# Patient Record
Sex: MALE | Race: WHITE
[De-identification: names, ages, dates, MRNs, and addresses within clinical notes are randomized per-mention and may not be internally consistent; named-entity substitution may affect disease eponyms.]

---

## 2018-11-23 ENCOUNTER — HOSPITAL ENCOUNTER (OUTPATIENT)
Dept: HOSPITAL 47 - RADUSWWP | Age: 68
Discharge: HOME | End: 2018-11-23
Attending: FAMILY MEDICINE
Payer: MEDICARE

## 2018-11-23 DIAGNOSIS — Z13.6: Primary | ICD-10-CM

## 2018-11-23 PROCEDURE — 93979 VASCULAR STUDY: CPT

## 2018-11-23 NOTE — US
EXAMINATION TYPE: US duplex aorta

 

DATE OF EXAM: 11/23/2018

 

COMPARISON: NONE

 

CLINICAL HISTORY: Z13.6 Encounter for screening for cardiovascular d; AAA screening, no HTN

 

EXAM MEASUREMENTS:

 

Abdominal Aorta:

** Proximal:  3.2cm Transverse by 2.7 cm AP

** Mid:  2.2cm Transverse by 1.9 cm AP

** Distal:  2.0cm Transverse by 1.9 cm AP

** Bifurcation:  1.3cm Right APRIL Transverse by 1.2 cm AP; 1.3cm Left APRIL Transverse by 1.3 cm AP

 

Color flow patency noted distal aorta with PSV of 77.6cm/sec. Aorta wall size is abnormal in Transver
se measure. Intimal segmental wall calcification is noted throughout.

 

 

 

IMPRESSION: 

1. No abdominal aortic aneurysm.

## 2021-04-09 ENCOUNTER — HOSPITAL ENCOUNTER (OUTPATIENT)
Dept: HOSPITAL 47 - RADECHMAIN | Age: 71
Discharge: HOME | End: 2021-04-09
Attending: FAMILY MEDICINE
Payer: MEDICARE

## 2021-04-09 DIAGNOSIS — I37.1: ICD-10-CM

## 2021-04-09 DIAGNOSIS — I08.1: Primary | ICD-10-CM

## 2021-04-09 PROCEDURE — 93306 TTE W/DOPPLER COMPLETE: CPT

## 2021-04-09 NOTE — ECHOF
Referral Reason:R06.02 SOB, R07.89 Chest Pain



MEASUREMENTS

--------

HEIGHT: 185.4 cm

WEIGHT: 79.4 kg

BP: 124/61

RVIDd:   3.3 cm     (< 3.3)

IVSd:   1.2 cm     (0.6 - 1.1)

LVIDd:   4.1 cm     (3.9 - 5.3)

LVPWd:   1.2 cm     (0.6 - 1.1)

IVSs:   1.9 cm

LVIDs:   2.2 cm

LVPWs:   1.4 cm

LA Diam:   3.8 cm     (2.7 - 3.8)

LAESV Index (A-L):   22.31 ml/m

Ao Diam:   4.0 cm     (2.0 - 3.7)

AV Cusp:   2.2 cm     (1.5 - 2.6)

MV EXCURSION:   15.119 mm     (> 18.000)

MV EF SLOPE:   17 mm/s     (70 - 150)

EPSS:   0.6 cm

MV E Juan:   0.83 m/s

MV DecT:   335 ms

MV A Juan:   0.87 m/s

MV E/A Ratio:   0.96 

RAP:   5.00 mmHg

RVSP:   21.00 mmHg







FINDINGS

--------

Sinus rhythm.

This was a technically adequate study.

The left ventricular size is normal.   There is borderline concentric left ventricular hypertrophy.  
 Overall left ventricular systolic function is normal with, an EF between 60 - 65 %.

The right ventricle is mildly enlarged.

Normal LA  size by volume 22+/-6 ml/m2.

The right atrium is normal in size.

Interatrial and interventricular septum intact.

The aortic valve is trileaflet and appears structurally normal.   Trace to mild aortic regurgitation.


The mitral valve leaflets are mildly thickened.   Mild mitral annular calcification present.

Mild tricuspid regurgitation present.   Right ventricular systolic pressure is normal at < 35 mmHg.

Trace/mild (physiologic)  pulmonic regurgitation.

The aortic root is dilated measuring 4.0cm.

Normal inferior vena cava with normal inspiratory collapse consistent with estimated right atrial pre
ssure of  5 mmHg.

There is no pericardial effusion.



CONCLUSIONS

--------

1. The left ventricular size is normal.

2. There is borderline concentric left ventricular hypertrophy.

3. Overall left ventricular systolic function is normal with, an EF between 60 - 65 %.

4. The right ventricle is mildly enlarged.

5. Normal LA size by volume 22+/-6 ml/m2.

6. The aortic valve is trileaflet and appears structurally normal.

7. Trace to mild aortic regurgitation.

8. The mitral valve leaflets are mildly thickened.

9. Mild mitral annular calcification present.

10. Mild tricuspid regurgitation present.

11. Trace/mild (physiologic)  pulmonic regurgitation.

12. The aortic root is dilated measuring 4.0cm.

13. There is no pericardial effusion.





SONOGRAPHER: Latasha Boyle RDCS

## 2021-04-30 ENCOUNTER — HOSPITAL ENCOUNTER (OUTPATIENT)
Dept: HOSPITAL 47 - RADNMMAIN | Age: 71
Discharge: HOME | End: 2021-04-30
Attending: FAMILY MEDICINE
Payer: MEDICARE

## 2021-04-30 DIAGNOSIS — R07.9: Primary | ICD-10-CM

## 2021-04-30 PROCEDURE — 78452 HT MUSCLE IMAGE SPECT MULT: CPT

## 2021-04-30 PROCEDURE — 93017 CV STRESS TEST TRACING ONLY: CPT

## 2021-04-30 NOTE — EST
EXERCISE STRESS



AGE:

70



SEX:

Male



HT:

73"



WT:

175 pounds



PROTOCOL:

Lexiscan Cardiolite



STAGE:



DURATION OF EXERCISE:



HEART RATE REST:

55



BLOOD PRESSURE REST:

120/70



MAXIMUM HEART RATE ACHIEVED:

80



MAXIMUM BLOOD PRESSURE:

126/70



85% MPHR:

129



100% MPHR:

152



METS:



INDICATIONS:

Chest pain.



CLINICAL INFORMATION:

Baseline rhythm is a sinus mechanism, rate of 55, normal axis and intervals.  Normal

electrocardiogram. Baseline blood pressure 120/70 mmHg.  Patient received injection of

Lexiscan. Electrocardiograph monitoring revealed no evidence of diagnostic ischemic ST

deviation. Cardiolite was injected per protocol.



CONCLUSION:

1. Nondiagnostic electrocardiograph stress testing.

2. Nuclear images will be reported separately.





MMODL / IJN: [id number] / Job#: [id number]

## 2021-04-30 NOTE — NM
EXAMINATION TYPE: NM stress lexiscan cardiolite

 

DATE OF EXAM: 4/30/2021

 

COMPARISON: NONE

 

HISTORY: Chest pain

 

TECHNIQUE:  After the intravenous administration of 9.8 mCi Tc 99m Sestamibi - Cardiolite resting SPE
CT images acquired 45 minutes post injection. 

 

The patient received 0.4mg Lexiscan, 25.0 mCi Tc 99m Sestamibi - Stress images obtained 30 minutes po
st injection 

 

FINDINGS:  

 

Review of stress and rest SPECT images demonstrates fixed defect involving the apex as well as a area
 of reversibility involving the apical inferior myocardium..  Gated analysis shows normal wall motion
 with an estimated left ventricular ejection fraction of 69 %. Report called to referring clinician 1
1:36 AM 4/30/2021.

 

 

 

IMPRESSION:

1. Findings suspicious for stress-induced reversible ischemia involving the apical inferior myocardiu
m. Correlate clinically.

2. Ejection fraction 69%.

## 2021-05-07 ENCOUNTER — HOSPITAL ENCOUNTER (OUTPATIENT)
Dept: HOSPITAL 47 - LABPAT | Age: 71
Discharge: HOME | End: 2021-05-07
Attending: INTERNAL MEDICINE
Payer: MEDICARE

## 2021-05-07 DIAGNOSIS — R07.2: ICD-10-CM

## 2021-05-07 DIAGNOSIS — Z01.812: Primary | ICD-10-CM

## 2021-05-07 LAB
ANION GAP SERPL CALC-SCNC: 7 MMOL/L
BUN SERPL-SCNC: 21 MG/DL (ref 9–20)
CHLORIDE SERPL-SCNC: 108 MMOL/L (ref 98–107)
CO2 SERPL-SCNC: 26 MMOL/L (ref 22–30)
ERYTHROCYTE [DISTWIDTH] IN BLOOD BY AUTOMATED COUNT: 2.35 M/UL (ref 4.3–5.9)
ERYTHROCYTE [DISTWIDTH] IN BLOOD: 15.3 % (ref 11.5–15.5)
HCT VFR BLD AUTO: 29 % (ref 39–53)
HGB BLD-MCNC: 9.2 GM/DL (ref 13–17.5)
MCH RBC QN AUTO: 39.1 PG (ref 25–35)
MCHC RBC AUTO-ENTMCNC: 31.6 G/DL (ref 31–37)
MCV RBC AUTO: 123.4 FL (ref 80–100)
PLATELET # BLD AUTO: 158 K/UL (ref 150–450)
POTASSIUM SERPL-SCNC: 4.6 MMOL/L (ref 3.5–5.1)
SODIUM SERPL-SCNC: 141 MMOL/L (ref 137–145)
WBC # BLD AUTO: 3.1 K/UL (ref 3.8–10.6)

## 2021-05-07 PROCEDURE — 36415 COLL VENOUS BLD VENIPUNCTURE: CPT

## 2021-05-07 PROCEDURE — 82565 ASSAY OF CREATININE: CPT

## 2021-05-07 PROCEDURE — 80051 ELECTROLYTE PANEL: CPT

## 2021-05-07 PROCEDURE — 84520 ASSAY OF UREA NITROGEN: CPT

## 2021-05-07 PROCEDURE — 85027 COMPLETE CBC AUTOMATED: CPT

## 2021-05-21 ENCOUNTER — HOSPITAL ENCOUNTER (OUTPATIENT)
Dept: HOSPITAL 47 - CATHCVL | Age: 71
Discharge: HOME | End: 2021-05-21
Attending: INTERNAL MEDICINE
Payer: MEDICARE

## 2021-05-21 VITALS — SYSTOLIC BLOOD PRESSURE: 116 MMHG | DIASTOLIC BLOOD PRESSURE: 59 MMHG | HEART RATE: 58 BPM

## 2021-05-21 VITALS — RESPIRATION RATE: 16 BRPM | TEMPERATURE: 97.7 F

## 2021-05-21 VITALS — BODY MASS INDEX: 23.1 KG/M2

## 2021-05-21 DIAGNOSIS — Z79.899: ICD-10-CM

## 2021-05-21 DIAGNOSIS — Z79.82: ICD-10-CM

## 2021-05-21 DIAGNOSIS — Z20.822: ICD-10-CM

## 2021-05-21 DIAGNOSIS — R94.39: ICD-10-CM

## 2021-05-21 DIAGNOSIS — R07.2: Primary | ICD-10-CM

## 2021-05-21 DIAGNOSIS — Z82.49: ICD-10-CM

## 2021-05-21 DIAGNOSIS — E78.2: ICD-10-CM

## 2021-05-21 DIAGNOSIS — F17.200: ICD-10-CM

## 2021-05-21 DIAGNOSIS — D64.9: ICD-10-CM

## 2021-05-21 LAB
BASOPHILS # BLD AUTO: 0 K/UL (ref 0–0.2)
BASOPHILS NFR BLD AUTO: 1 %
EOSINOPHIL # BLD AUTO: 0.2 K/UL (ref 0–0.7)
EOSINOPHIL NFR BLD AUTO: 5 %
ERYTHROCYTE [DISTWIDTH] IN BLOOD BY AUTOMATED COUNT: 2.32 M/UL (ref 4.3–5.9)
ERYTHROCYTE [DISTWIDTH] IN BLOOD: 14.5 % (ref 11.5–15.5)
HCT VFR BLD AUTO: 28 % (ref 39–53)
HGB BLD-MCNC: 9.5 GM/DL (ref 13–17.5)
LYMPHOCYTES # SPEC AUTO: 1.8 K/UL (ref 1–4.8)
LYMPHOCYTES NFR SPEC AUTO: 56 %
MCH RBC QN AUTO: 40.8 PG (ref 25–35)
MCHC RBC AUTO-ENTMCNC: 33.7 G/DL (ref 31–37)
MCV RBC AUTO: 120.9 FL (ref 80–100)
MONOCYTES # BLD AUTO: 0.1 K/UL (ref 0–1)
MONOCYTES NFR BLD AUTO: 4 %
NEUTROPHILS # BLD AUTO: 1.1 K/UL (ref 1.3–7.7)
NEUTROPHILS NFR BLD AUTO: 33 %
PLATELET # BLD AUTO: 141 K/UL (ref 150–450)
WBC # BLD AUTO: 3.2 K/UL (ref 3.8–10.6)

## 2021-05-21 PROCEDURE — 85025 COMPLETE CBC W/AUTO DIFF WBC: CPT

## 2021-05-21 PROCEDURE — 93458 L HRT ARTERY/VENTRICLE ANGIO: CPT

## 2021-05-21 PROCEDURE — 87635 SARS-COV-2 COVID-19 AMP PRB: CPT

## 2021-05-21 NOTE — LTR
May 21, 2021





Re: John Pablo





Dear Nohemi:



I have performed cardiac catheterization on John Pablo. A detailed

catheterization note is enclosed for your records.



In brief, cardiac catheterization shows normal coronary arteries and normal left

ventricular end-diastolic pressures.  Patient's symptoms are noncardiac in origin and

stress test is a false positive stress test.  His symptomatology could be related to

the anemia and the patient will follow up with you for the same.



Thank you for giving me the privilege to participate in the care of this pleasant

gentleman.



Sincerely yours,





MD BIA Chaidez / ANNY: [id number] / Job#: [id number]

## 2021-05-21 NOTE — CC
CARDIAC CATHETERIZATION REPORT



INDICATION:

Chest pain with abnormal stress test showing reversible perfusion defect involving the

inferoapical wall.



PROCEDURE NOTE:

After obtaining informed consent, left heart catheterization and coronary angiogram

were performed via the right femoral artery using standard Adiel catheters.  Patient

tolerated the procedure well without any obvious immediate complications.  A femoral

angiogram was performed and Angio-Seal was deployed for hemostasis.  Patient received

moderate conscious sedation.  Total sedation time was 12 minutes.



FINDINGS:

1. HEMODYNAMICS: Left ventricular end-diastolic pressure is 12 mm.  There is no

    significant gradient across the aortic valve.

2. LEFT VENTRICULOGRAM: Left ventriculogram is not performed.

3. ANGIOGRAPHIC DATA:

Left Main Coronary Artery: Left main coronary artery is a normal-sized vessel and is

free of stenosis.  Divides into left anterior descending coronary artery and circumflex

coronary artery.

LAD and its branches, circumflex coronary artery and its branches are free of

significant stenosis.

Right coronary artery is a large dominant vessel and is free of significant disease.



CONCLUSIONS:

1. Normal coronary arteries.

2. Normal left ventricular end-diastolic pressure.



PLAN:

I reviewed angiographic data with the patient and told him that his stress test is a

false positive stress test that he should stop the aspirin and the Imdur that he is

currently on.  He is anemic and I encouraged him to follow up with his primary care

physician and have further evaluation done for the same if it has not already been

done.





MMODL / IJN: [id number] / Job#: [id number]

## 2022-03-10 ENCOUNTER — HOSPITAL ENCOUNTER (OUTPATIENT)
Dept: HOSPITAL 47 - RADXRMAIN | Age: 72
Discharge: HOME | End: 2022-03-10
Attending: INTERNAL MEDICINE
Payer: MEDICARE

## 2022-03-10 DIAGNOSIS — R93.7: Primary | ICD-10-CM

## 2022-03-10 DIAGNOSIS — E78.5: ICD-10-CM

## 2022-03-10 DIAGNOSIS — D64.9: ICD-10-CM

## 2022-03-10 PROCEDURE — 77075 RADEX OSSEOUS SURVEY COMPL: CPT

## 2022-03-10 NOTE — XR
EXAMINATION TYPE: XR bone survey complete

 

DATE OF EXAM: 3/10/2022

 

COMPARISON: None available

 

HISTORY: History of bone marrow cancer

 

CHEST: Single AP view of the chest and ribs. Grossly unremarkable lungs. No pleural effusion or pneum
othorax. No cardiomegaly. Aortic atherosclerotic calcification. Unremarkable bony thoracic cage.

 

Bony calvarium : 2 views of the bony calvarium demonstrate.  Tiny lucencies are seen in the superior 
aspects of the parietal bones measuring up to 5 mm, nonspecific. Subtle underlying osseous lesions ca
nnot be excluded. Unremarkable calvarial bones otherwise. 

 

Spine: Two views of the cervical, thoracic and lumbar spines are submitted.  Degenerative changes of 
the lower cervical spine with C5-6 and C6-7 facet osteoarthropathy more on the left side. Dextroscoli
osis of the midthoracic spine. Exaggerated dorsal kyphosis. Degenerative changes of the mid to lower 
thoracic spine and lumbar spine. Degenerative changes of the lower lumbar spine. No gross lytic or sc
lerotic lesion identified in the spine.

 

PELVIS: Single view of the pelvis. No gross lytic or sclerotic bone lesion. Mild degenerative changes
 of the hip joints. Bilateral pelvic phleboliths rather than urinary calculi. Arterial atheroscleroti
c calcifications.

 

HUMERAL BONES: Two views. No gross lytic or sclerotic bone lesions.

 

FEMORAL BONES: 2 views.  No gross lytic or sclerotic bone lesion. Arterial atherosclerotic calcificat
ions.

 

IMPRESSION: Subtle tiny lucencies at the superior aspects of the parietal bones, nonspecific. Subtle 
underlying lesions cannot be excluded. Otherwise no definite lytic or sclerotic bone lesion identifie
d in the remainder of the visualized bones.

 

Please note that tiny lytic lesion cannot be excluded by this skeletal survey. Whole-body CT is more 
sensitive for detection of such lesions. Incidental findings as described above.